# Patient Record
Sex: MALE | Race: WHITE | NOT HISPANIC OR LATINO | Employment: OTHER | URBAN - METROPOLITAN AREA
[De-identification: names, ages, dates, MRNs, and addresses within clinical notes are randomized per-mention and may not be internally consistent; named-entity substitution may affect disease eponyms.]

---

## 2017-04-25 ENCOUNTER — RECORDS - HEALTHEAST (OUTPATIENT)
Dept: LAB | Facility: CLINIC | Age: 67
End: 2017-04-25

## 2017-04-25 LAB
CHOLEST SERPL-MCNC: 172 MG/DL
FASTING STATUS PATIENT QL REPORTED: YES
HDLC SERPL-MCNC: 35 MG/DL
LDLC SERPL CALC-MCNC: 102 MG/DL
PSA SERPL-MCNC: 1.4 NG/ML (ref 0–4.5)
TRIGL SERPL-MCNC: 176 MG/DL

## 2017-10-31 ENCOUNTER — RECORDS - HEALTHEAST (OUTPATIENT)
Dept: LAB | Facility: CLINIC | Age: 67
End: 2017-10-31

## 2017-11-01 LAB
CHOLEST SERPL-MCNC: 186 MG/DL
FASTING STATUS PATIENT QL REPORTED: NO
HDLC SERPL-MCNC: 37 MG/DL
LDLC SERPL CALC-MCNC: 95 MG/DL
TRIGL SERPL-MCNC: 270 MG/DL

## 2018-05-11 ENCOUNTER — RECORDS - HEALTHEAST (OUTPATIENT)
Dept: LAB | Facility: CLINIC | Age: 68
End: 2018-05-11

## 2018-05-11 LAB
ANION GAP SERPL CALCULATED.3IONS-SCNC: 10 MMOL/L (ref 5–18)
BUN SERPL-MCNC: 27 MG/DL (ref 8–22)
CALCIUM SERPL-MCNC: 9.7 MG/DL (ref 8.5–10.5)
CHLORIDE BLD-SCNC: 104 MMOL/L (ref 98–107)
CHOLEST SERPL-MCNC: 183 MG/DL
CO2 SERPL-SCNC: 26 MMOL/L (ref 22–31)
CREAT SERPL-MCNC: 1.2 MG/DL (ref 0.7–1.3)
FASTING STATUS PATIENT QL REPORTED: YES
GFR SERPL CREATININE-BSD FRML MDRD: >60 ML/MIN/1.73M2
GLUCOSE BLD-MCNC: 141 MG/DL (ref 70–125)
HDLC SERPL-MCNC: 34 MG/DL
LDLC SERPL CALC-MCNC: 117 MG/DL
POTASSIUM BLD-SCNC: 3.9 MMOL/L (ref 3.5–5)
SODIUM SERPL-SCNC: 140 MMOL/L (ref 136–145)
TRIGL SERPL-MCNC: 159 MG/DL

## 2018-12-19 ENCOUNTER — RECORDS - HEALTHEAST (OUTPATIENT)
Dept: LAB | Facility: CLINIC | Age: 68
End: 2018-12-19

## 2018-12-19 LAB
ANION GAP SERPL CALCULATED.3IONS-SCNC: 10 MMOL/L (ref 5–18)
BUN SERPL-MCNC: 23 MG/DL (ref 8–22)
CALCIUM SERPL-MCNC: 9.9 MG/DL (ref 8.5–10.5)
CHLORIDE BLD-SCNC: 103 MMOL/L (ref 98–107)
CHOLEST SERPL-MCNC: 180 MG/DL
CO2 SERPL-SCNC: 27 MMOL/L (ref 22–31)
CREAT SERPL-MCNC: 1.05 MG/DL (ref 0.7–1.3)
FASTING STATUS PATIENT QL REPORTED: ABNORMAL
GFR SERPL CREATININE-BSD FRML MDRD: >60 ML/MIN/1.73M2
GLUCOSE BLD-MCNC: 118 MG/DL (ref 70–125)
HDLC SERPL-MCNC: 36 MG/DL
LDLC SERPL CALC-MCNC: 81 MG/DL
POTASSIUM BLD-SCNC: 4.2 MMOL/L (ref 3.5–5)
SODIUM SERPL-SCNC: 140 MMOL/L (ref 136–145)
TRIGL SERPL-MCNC: 314 MG/DL

## 2019-07-16 ENCOUNTER — RECORDS - HEALTHEAST (OUTPATIENT)
Dept: LAB | Facility: CLINIC | Age: 69
End: 2019-07-16

## 2019-07-16 LAB
ANION GAP SERPL CALCULATED.3IONS-SCNC: 11 MMOL/L (ref 5–18)
BUN SERPL-MCNC: 21 MG/DL (ref 8–22)
CALCIUM SERPL-MCNC: 10 MG/DL (ref 8.5–10.5)
CHLORIDE BLD-SCNC: 101 MMOL/L (ref 98–107)
CHOLEST SERPL-MCNC: 178 MG/DL
CO2 SERPL-SCNC: 28 MMOL/L (ref 22–31)
CREAT SERPL-MCNC: 1.04 MG/DL (ref 0.7–1.3)
FASTING STATUS PATIENT QL REPORTED: ABNORMAL
GFR SERPL CREATININE-BSD FRML MDRD: >60 ML/MIN/1.73M2
GLUCOSE BLD-MCNC: 115 MG/DL (ref 70–125)
HDLC SERPL-MCNC: 43 MG/DL
LDLC SERPL CALC-MCNC: 105 MG/DL
POTASSIUM BLD-SCNC: 4.7 MMOL/L (ref 3.5–5)
PSA SERPL-MCNC: 1.3 NG/ML (ref 0–4.5)
SODIUM SERPL-SCNC: 140 MMOL/L (ref 136–145)
TRIGL SERPL-MCNC: 150 MG/DL

## 2019-12-09 ENCOUNTER — RECORDS - HEALTHEAST (OUTPATIENT)
Dept: LAB | Facility: CLINIC | Age: 69
End: 2019-12-09

## 2019-12-09 LAB
ANION GAP SERPL CALCULATED.3IONS-SCNC: 10 MMOL/L (ref 5–18)
BUN SERPL-MCNC: 18 MG/DL (ref 8–22)
CALCIUM SERPL-MCNC: 9.7 MG/DL (ref 8.5–10.5)
CHLORIDE BLD-SCNC: 103 MMOL/L (ref 98–107)
CO2 SERPL-SCNC: 28 MMOL/L (ref 22–31)
CREAT SERPL-MCNC: 1.3 MG/DL (ref 0.7–1.3)
GFR SERPL CREATININE-BSD FRML MDRD: 55 ML/MIN/1.73M2
GLUCOSE BLD-MCNC: 150 MG/DL (ref 70–125)
POTASSIUM BLD-SCNC: 4.3 MMOL/L (ref 3.5–5)
SODIUM SERPL-SCNC: 141 MMOL/L (ref 136–145)

## 2019-12-10 LAB — HBA1C MFR BLD: 6 % (ref 4.2–6.1)

## 2020-07-06 ENCOUNTER — RECORDS - HEALTHEAST (OUTPATIENT)
Dept: LAB | Facility: CLINIC | Age: 70
End: 2020-07-06

## 2020-07-06 LAB
ANION GAP SERPL CALCULATED.3IONS-SCNC: 16 MMOL/L (ref 5–18)
BUN SERPL-MCNC: 19 MG/DL (ref 8–22)
CALCIUM SERPL-MCNC: 9.9 MG/DL (ref 8.5–10.5)
CHLORIDE BLD-SCNC: 102 MMOL/L (ref 98–107)
CHOLEST SERPL-MCNC: 171 MG/DL
CO2 SERPL-SCNC: 22 MMOL/L (ref 22–31)
CREAT SERPL-MCNC: 1.15 MG/DL (ref 0.7–1.3)
FASTING STATUS PATIENT QL REPORTED: YES
GFR SERPL CREATININE-BSD FRML MDRD: >60 ML/MIN/1.73M2
GLUCOSE BLD-MCNC: 132 MG/DL (ref 70–125)
HDLC SERPL-MCNC: 35 MG/DL
LDLC SERPL CALC-MCNC: 85 MG/DL
POTASSIUM BLD-SCNC: 4 MMOL/L (ref 3.5–5)
PSA SERPL-MCNC: 1.4 NG/ML (ref 0–4.5)
SODIUM SERPL-SCNC: 140 MMOL/L (ref 136–145)
TRIGL SERPL-MCNC: 255 MG/DL

## 2020-12-08 ENCOUNTER — RECORDS - HEALTHEAST (OUTPATIENT)
Dept: LAB | Facility: CLINIC | Age: 70
End: 2020-12-08

## 2020-12-08 LAB
ANION GAP SERPL CALCULATED.3IONS-SCNC: 14 MMOL/L (ref 5–18)
BUN SERPL-MCNC: 24 MG/DL (ref 8–22)
CALCIUM SERPL-MCNC: 9.5 MG/DL (ref 8.5–10.5)
CHLORIDE BLD-SCNC: 103 MMOL/L (ref 98–107)
CHOLEST SERPL-MCNC: 160 MG/DL
CO2 SERPL-SCNC: 24 MMOL/L (ref 22–31)
CREAT SERPL-MCNC: 1.03 MG/DL (ref 0.7–1.3)
FASTING STATUS PATIENT QL REPORTED: NORMAL
GFR SERPL CREATININE-BSD FRML MDRD: >60 ML/MIN/1.73M2
GLUCOSE BLD-MCNC: 113 MG/DL (ref 70–125)
HDLC SERPL-MCNC: 41 MG/DL
LDLC SERPL CALC-MCNC: 101 MG/DL
POTASSIUM BLD-SCNC: 4.2 MMOL/L (ref 3.5–5)
SODIUM SERPL-SCNC: 141 MMOL/L (ref 136–145)
TRIGL SERPL-MCNC: 88 MG/DL

## 2021-07-28 ENCOUNTER — LAB REQUISITION (OUTPATIENT)
Dept: LAB | Facility: CLINIC | Age: 71
End: 2021-07-28
Payer: MEDICARE

## 2021-07-28 DIAGNOSIS — E55.9 VITAMIN D DEFICIENCY, UNSPECIFIED: ICD-10-CM

## 2021-07-28 DIAGNOSIS — E78.5 HYPERLIPIDEMIA, UNSPECIFIED: ICD-10-CM

## 2021-07-28 DIAGNOSIS — I10 ESSENTIAL (PRIMARY) HYPERTENSION: ICD-10-CM

## 2021-07-28 DIAGNOSIS — Z12.5 ENCOUNTER FOR SCREENING FOR MALIGNANT NEOPLASM OF PROSTATE: ICD-10-CM

## 2021-07-28 DIAGNOSIS — E04.9 NONTOXIC GOITER, UNSPECIFIED: ICD-10-CM

## 2021-07-28 LAB
ALBUMIN SERPL-MCNC: 4.3 G/DL (ref 3.5–5)
ALP SERPL-CCNC: 20 U/L (ref 45–120)
ALT SERPL W P-5'-P-CCNC: 14 U/L (ref 0–45)
ANION GAP SERPL CALCULATED.3IONS-SCNC: 13 MMOL/L (ref 5–18)
AST SERPL W P-5'-P-CCNC: 22 U/L (ref 0–40)
BILIRUB SERPL-MCNC: 0.8 MG/DL (ref 0–1)
BUN SERPL-MCNC: 22 MG/DL (ref 8–22)
CALCIUM SERPL-MCNC: 9.5 MG/DL (ref 8.5–10.5)
CHLORIDE BLD-SCNC: 103 MMOL/L (ref 98–107)
CHOLEST SERPL-MCNC: 169 MG/DL
CO2 SERPL-SCNC: 27 MMOL/L (ref 22–31)
CREAT SERPL-MCNC: 1.18 MG/DL (ref 0.7–1.3)
GFR SERPL CREATININE-BSD FRML MDRD: 63 ML/MIN/1.73M2
GLUCOSE BLD-MCNC: 124 MG/DL (ref 70–125)
HDLC SERPL-MCNC: 39 MG/DL
LDLC SERPL CALC-MCNC: 109 MG/DL
MAGNESIUM SERPL-MCNC: 2 MG/DL (ref 1.8–2.6)
POTASSIUM BLD-SCNC: 4 MMOL/L (ref 3.5–5)
PROT SERPL-MCNC: 7.4 G/DL (ref 6–8)
PSA SERPL-MCNC: 1.52 UG/L (ref 0–4.5)
SODIUM SERPL-SCNC: 143 MMOL/L (ref 136–145)
T3 SERPL-MCNC: 90 NG/DL (ref 60–181)
T4 SERPL-MCNC: 8.1 UG/DL (ref 4.5–13)
TRIGL SERPL-MCNC: 103 MG/DL
VIT B12 SERPL-MCNC: 789 PG/ML (ref 213–816)

## 2021-07-28 PROCEDURE — 82306 VITAMIN D 25 HYDROXY: CPT | Mod: ORL | Performed by: NURSE PRACTITIONER

## 2021-07-28 PROCEDURE — 84480 ASSAY TRIIODOTHYRONINE (T3): CPT | Mod: ORL | Performed by: NURSE PRACTITIONER

## 2021-07-28 PROCEDURE — 82607 VITAMIN B-12: CPT | Performed by: NURSE PRACTITIONER

## 2021-07-28 PROCEDURE — 36415 COLL VENOUS BLD VENIPUNCTURE: CPT | Performed by: NURSE PRACTITIONER

## 2021-07-28 PROCEDURE — 80061 LIPID PANEL: CPT | Mod: ORL | Performed by: NURSE PRACTITIONER

## 2021-07-28 PROCEDURE — 84436 ASSAY OF TOTAL THYROXINE: CPT | Mod: ORL | Performed by: NURSE PRACTITIONER

## 2021-07-28 PROCEDURE — 86376 MICROSOMAL ANTIBODY EACH: CPT | Mod: ORL | Performed by: NURSE PRACTITIONER

## 2021-07-28 PROCEDURE — G0103 PSA SCREENING: HCPCS | Mod: ORL | Performed by: NURSE PRACTITIONER

## 2021-07-28 PROCEDURE — 83735 ASSAY OF MAGNESIUM: CPT | Performed by: NURSE PRACTITIONER

## 2021-07-28 PROCEDURE — 82040 ASSAY OF SERUM ALBUMIN: CPT | Performed by: NURSE PRACTITIONER

## 2021-07-29 LAB
DEPRECATED CALCIDIOL+CALCIFEROL SERPL-MC: 29 UG/L (ref 30–80)
THYROPEROXIDASE AB SERPL-ACNC: <3 IU/ML (ref 0–6)

## 2021-09-08 ENCOUNTER — TELEPHONE (OUTPATIENT)
Dept: OPHTHALMOLOGY | Facility: CLINIC | Age: 71
End: 2021-09-08

## 2021-09-08 NOTE — TELEPHONE ENCOUNTER
Spoke with patient s spouse regarding rescheduling appointment today 9/8/2021 as Provider had to cancel Clinic. Rescheduled patient accordingly and sent appointment letter to confirmed address.-Per Patent s spouse

## 2021-11-10 ENCOUNTER — OFFICE VISIT (OUTPATIENT)
Dept: OPHTHALMOLOGY | Facility: CLINIC | Age: 71
End: 2021-11-10
Payer: MEDICARE

## 2021-11-10 ENCOUNTER — TELEPHONE (OUTPATIENT)
Dept: OPHTHALMOLOGY | Facility: CLINIC | Age: 71
End: 2021-11-10

## 2021-11-10 DIAGNOSIS — H52.03 HYPERMETROPIA OF BOTH EYES: Primary | ICD-10-CM

## 2021-11-10 DIAGNOSIS — H02.88A MEIBOMIAN GLAND DYSFUNCTION (MGD) OF UPPER AND LOWER LIDS OF BOTH EYES: ICD-10-CM

## 2021-11-10 DIAGNOSIS — H25.093 AGE-RELATED INCIPIENT CATARACT OF BOTH EYES: ICD-10-CM

## 2021-11-10 DIAGNOSIS — H40.003 GLAUCOMA SUSPECT OF BOTH EYES: ICD-10-CM

## 2021-11-10 DIAGNOSIS — H02.88B MEIBOMIAN GLAND DYSFUNCTION (MGD) OF UPPER AND LOWER LIDS OF BOTH EYES: ICD-10-CM

## 2021-11-10 PROCEDURE — 92004 COMPRE OPH EXAM NEW PT 1/>: CPT | Performed by: OPTOMETRIST

## 2021-11-10 PROCEDURE — 92015 DETERMINE REFRACTIVE STATE: CPT | Mod: GY | Performed by: OPTOMETRIST

## 2021-11-10 RX ORDER — HYDROCHLOROTHIAZIDE 25 MG/1
TABLET ORAL
COMMUNITY

## 2021-11-10 RX ORDER — METOPROLOL SUCCINATE 100 MG/1
1 TABLET, EXTENDED RELEASE ORAL
COMMUNITY

## 2021-11-10 RX ORDER — TAMSULOSIN HYDROCHLORIDE 0.4 MG/1
CAPSULE ORAL
COMMUNITY

## 2021-11-10 RX ORDER — BLOOD SUGAR DIAGNOSTIC
STRIP MISCELLANEOUS DAILY
COMMUNITY

## 2021-11-10 ASSESSMENT — REFRACTION_MANIFEST
OD_AXIS: 030
OD_ADD: +2.50
OD_SPHERE: +1.50
OS_CYLINDER: +0.25
OS_SPHERE: +1.50
OD_CYLINDER: +1.00
OS_AXIS: 155
OS_ADD: +2.50

## 2021-11-10 ASSESSMENT — SLIT LAMP EXAM - LIDS
COMMENTS: 1+ MGD
COMMENTS: 1+ MGD

## 2021-11-10 ASSESSMENT — TONOMETRY
OS_IOP_MMHG: 7
IOP_METHOD: ICARE
OD_IOP_MMHG: 11

## 2021-11-10 ASSESSMENT — VISUAL ACUITY
OS_CC: 20/20
METHOD: SNELLEN - LINEAR
CORRECTION_TYPE: GLASSES
OD_CC: 20/20

## 2021-11-10 ASSESSMENT — CONF VISUAL FIELD
OS_NORMAL: 1
METHOD: COUNTING FINGERS
OD_NORMAL: 1

## 2021-11-10 ASSESSMENT — REFRACTION_WEARINGRX
SPECS_TYPE: PAL
OD_ADD: +2.50
OS_ADD: +2.50
OD_AXIS: 030
OS_AXIS: 147
OS_SPHERE: +1.50
OS_CYLINDER: +0.25
OD_CYLINDER: +0.50
OD_SPHERE: +1.50

## 2021-11-10 ASSESSMENT — EXTERNAL EXAM - RIGHT EYE: OD_EXAM: NORMAL

## 2021-11-10 ASSESSMENT — CUP TO DISC RATIO
OS_RATIO: 0.6
OD_RATIO: 0.7

## 2021-11-10 ASSESSMENT — EXTERNAL EXAM - LEFT EYE: OS_EXAM: NORMAL

## 2021-11-10 NOTE — NURSING NOTE
Chief Complaints and History of Present Illnesses   Patient presents with     COMPREHENSIVE EYE EXAM     Chief Complaint(s) and History of Present Illness(es)     COMPREHENSIVE EYE EXAM     Laterality: both eyes    Onset: years ago    Frequency: constantly    Course: stable    Treatments tried: no treatments    Pain scale: 0/10              Comments     Annual eye exam, last exam 2 years ago. Pt feels vision is stable. No pain. No drops.    NIKKI Sharif COT 1:00 PM November 10, 2021

## 2021-11-10 NOTE — PROGRESS NOTES
History  HPI     COMPREHENSIVE EYE EXAM     In both eyes.  This started years ago.  Occurring constantly.  Since onset it is stable.  Treatments tried include no treatments.  Pain was noted as 0/10.              Comments     Annual eye exam, last exam 2 years ago. Pt feels vision is stable. No pain. No drops.    NIKKI Sharif COT 1:00 PM November 10, 2021             Last edited by Elida Hartmann COT on 11/10/2021  1:00 PM. (History)          Assessment/Plan  (H52.03) Hypermetropia of both eyes  (primary encounter diagnosis)  Comment: Hyperopia with presbyopia both eyes   Plan: REFRACTION         Educated patient on condition and clinical findings. Dispensed spectacle prescription for full time wear. Monitor annually.    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 Based on results, determine if further testing is warranted.    (H25.093) Age-related incipient cataract of both eyes  Comment: Not visually significant  Plan:  No treatment indicated at this time. Monitor annually.    (H02.88A,  H02.88B) Meibomian gland dysfunction (MGD) of upper and lower lids of both eyes  Comment: Asymptomatic  Plan:  Recommended warm compresses and artificial tears as needed for comfort.    Complete documentation of historical and exam elements from today's encounter can  be found in the full encounter summary report (not reduplicated in this progress  note). I personally obtained the chief complaint(s) and history of present illness. I  confirmed and edited as necessary the review of systems, past medical/surgical  history, family history, social history, and examination findings as documented by  others; and I examined the patient myself. I personally reviewed the relevant tests,  images, and reports as documented above. I formulated and edited as necessary the  assessment  and plan and discussed the findings and management plan with the  patient and family.    Jose Raul Bean OD, FAAO

## 2021-11-11 ENCOUNTER — TELEPHONE (OUTPATIENT)
Dept: OPHTHALMOLOGY | Facility: CLINIC | Age: 71
End: 2021-11-11
Payer: MEDICARE

## 2021-11-11 NOTE — TELEPHONE ENCOUNTER
Patient returned VM  regarding scheduling for RNFL OCT only (technician only visit at [facility]) -Per . Scheduled patient accordingly and sent appointment letter and map to confirmed email. -Per Patient

## 2021-11-15 ENCOUNTER — ALLIED HEALTH/NURSE VISIT (OUTPATIENT)
Dept: OPHTHALMOLOGY | Facility: CLINIC | Age: 71
End: 2021-11-15
Attending: OPTOMETRIST
Payer: MEDICARE

## 2021-11-15 DIAGNOSIS — H40.003 GLAUCOMA SUSPECT OF BOTH EYES: ICD-10-CM

## 2021-11-15 PROCEDURE — 99207 PR NO CHARGE COORDINATED CARE PS: CPT

## 2022-09-26 ENCOUNTER — LAB REQUISITION (OUTPATIENT)
Dept: LAB | Facility: CLINIC | Age: 72
End: 2022-09-26
Payer: MEDICARE

## 2022-09-26 DIAGNOSIS — E78.5 HYPERLIPIDEMIA, UNSPECIFIED: ICD-10-CM

## 2022-09-26 DIAGNOSIS — E55.9 VITAMIN D DEFICIENCY, UNSPECIFIED: ICD-10-CM

## 2022-09-26 DIAGNOSIS — Z00.00 ENCOUNTER FOR GENERAL ADULT MEDICAL EXAMINATION WITHOUT ABNORMAL FINDINGS: ICD-10-CM

## 2022-09-26 DIAGNOSIS — I10 ESSENTIAL (PRIMARY) HYPERTENSION: ICD-10-CM

## 2022-09-26 LAB
ANION GAP SERPL CALCULATED.3IONS-SCNC: 9 MMOL/L (ref 7–15)
BUN SERPL-MCNC: 18.1 MG/DL (ref 8–23)
CALCIUM SERPL-MCNC: 9.4 MG/DL (ref 8.8–10.2)
CHLORIDE SERPL-SCNC: 98 MMOL/L (ref 98–107)
CHOLEST SERPL-MCNC: 181 MG/DL
CREAT SERPL-MCNC: 1.18 MG/DL (ref 0.67–1.17)
DEPRECATED HCO3 PLAS-SCNC: 30 MMOL/L (ref 22–29)
GFR SERPL CREATININE-BSD FRML MDRD: 67 ML/MIN/1.73M2
GLUCOSE SERPL-MCNC: 133 MG/DL (ref 70–99)
HDLC SERPL-MCNC: 39 MG/DL
LDLC SERPL CALC-MCNC: 110 MG/DL
MAGNESIUM SERPL-MCNC: 1.9 MG/DL (ref 1.7–2.3)
NONHDLC SERPL-MCNC: 142 MG/DL
POTASSIUM SERPL-SCNC: 4.1 MMOL/L (ref 3.4–5.3)
PSA SERPL-MCNC: 1.5 NG/ML (ref 0–4.5)
SODIUM SERPL-SCNC: 137 MMOL/L (ref 136–145)
TRIGL SERPL-MCNC: 158 MG/DL

## 2022-09-26 PROCEDURE — G0103 PSA SCREENING: HCPCS | Mod: ORL | Performed by: NURSE PRACTITIONER

## 2022-09-26 PROCEDURE — 80061 LIPID PANEL: CPT | Mod: ORL | Performed by: NURSE PRACTITIONER

## 2022-09-26 PROCEDURE — 80048 BASIC METABOLIC PNL TOTAL CA: CPT | Mod: ORL | Performed by: NURSE PRACTITIONER

## 2022-09-26 PROCEDURE — 83735 ASSAY OF MAGNESIUM: CPT | Mod: ORL | Performed by: NURSE PRACTITIONER

## 2022-09-26 PROCEDURE — 82306 VITAMIN D 25 HYDROXY: CPT | Mod: ORL | Performed by: NURSE PRACTITIONER

## 2022-09-27 LAB — DEPRECATED CALCIDIOL+CALCIFEROL SERPL-MC: 32 UG/L (ref 20–75)

## 2023-09-12 ENCOUNTER — LAB REQUISITION (OUTPATIENT)
Dept: LAB | Facility: CLINIC | Age: 73
End: 2023-09-12
Payer: MEDICARE

## 2023-09-12 DIAGNOSIS — I10 ESSENTIAL (PRIMARY) HYPERTENSION: ICD-10-CM

## 2023-09-12 DIAGNOSIS — E78.5 HYPERLIPIDEMIA, UNSPECIFIED: ICD-10-CM

## 2023-09-12 DIAGNOSIS — Z12.5 ENCOUNTER FOR SCREENING FOR MALIGNANT NEOPLASM OF PROSTATE: ICD-10-CM

## 2023-09-12 PROCEDURE — 80061 LIPID PANEL: CPT | Mod: ORL | Performed by: NURSE PRACTITIONER

## 2023-09-12 PROCEDURE — 80053 COMPREHEN METABOLIC PANEL: CPT | Mod: ORL | Performed by: NURSE PRACTITIONER

## 2023-09-12 PROCEDURE — G0103 PSA SCREENING: HCPCS | Mod: ORL | Performed by: NURSE PRACTITIONER

## 2023-09-12 PROCEDURE — 83735 ASSAY OF MAGNESIUM: CPT | Mod: ORL | Performed by: NURSE PRACTITIONER

## 2023-09-13 LAB
ALBUMIN SERPL BCG-MCNC: 4.5 G/DL (ref 3.5–5.2)
ALP SERPL-CCNC: 17 U/L (ref 40–129)
ALT SERPL W P-5'-P-CCNC: 18 U/L (ref 0–70)
ANION GAP SERPL CALCULATED.3IONS-SCNC: 17 MMOL/L (ref 7–15)
AST SERPL W P-5'-P-CCNC: 23 U/L (ref 0–45)
BILIRUB SERPL-MCNC: 0.6 MG/DL
BUN SERPL-MCNC: 20.3 MG/DL (ref 8–23)
CALCIUM SERPL-MCNC: 9.9 MG/DL (ref 8.8–10.2)
CHLORIDE SERPL-SCNC: 100 MMOL/L (ref 98–107)
CHOLEST SERPL-MCNC: 181 MG/DL
CREAT SERPL-MCNC: 1.22 MG/DL (ref 0.67–1.17)
DEPRECATED HCO3 PLAS-SCNC: 23 MMOL/L (ref 22–29)
EGFRCR SERPLBLD CKD-EPI 2021: 64 ML/MIN/1.73M2
GLUCOSE SERPL-MCNC: 135 MG/DL (ref 70–99)
HDLC SERPL-MCNC: 40 MG/DL
LDLC SERPL CALC-MCNC: 116 MG/DL
MAGNESIUM SERPL-MCNC: 2 MG/DL (ref 1.7–2.3)
NONHDLC SERPL-MCNC: 141 MG/DL
POTASSIUM SERPL-SCNC: 4.2 MMOL/L (ref 3.4–5.3)
PROT SERPL-MCNC: 7.8 G/DL (ref 6.4–8.3)
PSA SERPL DL<=0.01 NG/ML-MCNC: 1.24 NG/ML (ref 0–6.5)
SODIUM SERPL-SCNC: 140 MMOL/L (ref 136–145)
TRIGL SERPL-MCNC: 127 MG/DL

## 2024-04-18 ENCOUNTER — LAB REQUISITION (OUTPATIENT)
Dept: LAB | Facility: CLINIC | Age: 74
End: 2024-04-18
Payer: MEDICARE

## 2024-04-18 DIAGNOSIS — E78.5 HYPERLIPIDEMIA, UNSPECIFIED: ICD-10-CM

## 2024-04-18 DIAGNOSIS — I10 ESSENTIAL (PRIMARY) HYPERTENSION: ICD-10-CM

## 2024-04-18 PROCEDURE — 80053 COMPREHEN METABOLIC PANEL: CPT | Mod: ORL | Performed by: NURSE PRACTITIONER

## 2024-04-18 PROCEDURE — 80061 LIPID PANEL: CPT | Mod: ORL | Performed by: NURSE PRACTITIONER

## 2024-04-19 LAB
ALBUMIN SERPL BCG-MCNC: 4.4 G/DL (ref 3.5–5.2)
ALP SERPL-CCNC: 16 U/L (ref 40–150)
ALT SERPL W P-5'-P-CCNC: 14 U/L (ref 0–70)
ANION GAP SERPL CALCULATED.3IONS-SCNC: 11 MMOL/L (ref 7–15)
AST SERPL W P-5'-P-CCNC: 21 U/L (ref 0–45)
BILIRUB SERPL-MCNC: 0.6 MG/DL
BUN SERPL-MCNC: 21.4 MG/DL (ref 8–23)
CALCIUM SERPL-MCNC: 9.7 MG/DL (ref 8.8–10.2)
CHLORIDE SERPL-SCNC: 102 MMOL/L (ref 98–107)
CHOLEST SERPL-MCNC: 176 MG/DL
CREAT SERPL-MCNC: 1.17 MG/DL (ref 0.67–1.17)
DEPRECATED HCO3 PLAS-SCNC: 25 MMOL/L (ref 22–29)
EGFRCR SERPLBLD CKD-EPI 2021: 67 ML/MIN/1.73M2
FASTING STATUS PATIENT QL REPORTED: ABNORMAL
GLUCOSE SERPL-MCNC: 137 MG/DL (ref 70–99)
HDLC SERPL-MCNC: 41 MG/DL
LDLC SERPL CALC-MCNC: 110 MG/DL
NONHDLC SERPL-MCNC: 135 MG/DL
POTASSIUM SERPL-SCNC: 4.2 MMOL/L (ref 3.4–5.3)
PROT SERPL-MCNC: 7.4 G/DL (ref 6.4–8.3)
SODIUM SERPL-SCNC: 138 MMOL/L (ref 135–145)
TRIGL SERPL-MCNC: 126 MG/DL

## 2024-05-01 ENCOUNTER — LAB REQUISITION (OUTPATIENT)
Dept: LAB | Facility: CLINIC | Age: 74
End: 2024-05-01
Payer: MEDICARE

## 2024-05-01 DIAGNOSIS — R74.8 ABNORMAL LEVELS OF OTHER SERUM ENZYMES: ICD-10-CM

## 2024-05-01 PROCEDURE — 83735 ASSAY OF MAGNESIUM: CPT | Mod: ORL | Performed by: NURSE PRACTITIONER

## 2024-05-01 PROCEDURE — 84100 ASSAY OF PHOSPHORUS: CPT | Mod: ORL | Performed by: NURSE PRACTITIONER

## 2024-05-01 PROCEDURE — 84443 ASSAY THYROID STIM HORMONE: CPT | Mod: ORL | Performed by: NURSE PRACTITIONER

## 2024-05-02 LAB
MAGNESIUM SERPL-MCNC: 2 MG/DL (ref 1.7–2.3)
PHOSPHATE SERPL-MCNC: 4 MG/DL (ref 2.5–4.5)
TSH SERPL DL<=0.005 MIU/L-ACNC: 2.04 UIU/ML (ref 0.3–4.2)

## 2024-10-29 NOTE — PROGRESS NOTES
Medication Therapy Management (MTM) Encounter    ASSESSMENT:                            Medication Adherence/Access: No issues identified.    Diabetes   Patient is meeting A1c goal of < 7%.  Patient would benefit from starting continuous glucose monitoring.     Hypertension   Stable.    Hyperlipidemia   Stable.    BPH  Stable.     PLAN:                            Today we placed a Freestyle Sunita sensor on your arm to track your blood sugars throughout the day. With your phone as your reader, it will track your blood sugars continuously. At a minimum, try to check your sushma a few times each day to see where you are at with your blood sugar readings.   Continue taking all medications as prescribed at this time.     Follow-up: Return in 1 week (on 11/6/2024) for Follow up with Areli Yung.    SUBJECTIVE/OBJECTIVE:                          Sudhir Gonzales is a 73 year old male seen for an initial visit. He was referred to me from Areli Yung.      Reason for visit: Discuss diabetes management.    Allergies/ADRs: Reviewed in chart  Past Medical History: Reviewed in chart  Tobacco: He reports that he has never smoked. He has never used smokeless tobacco.  Alcohol: 1-3 beverages / week    Medication Adherence/Access: no issues reported.         Diabetes   Patient is in clinic today to discuss diabetes management. He has been diet controlled and has never taken blood sugar lowering medications. Notes his fasting blood sugars have trended up over the last three months. Now his readings are more in the 140s, where previously they were consistently in the 120s. There have not been any changes with his diet or exercise habits. Patient follows a clean diet and exercises frequently. He is in today to discuss a CGM. Would like to monitor blood sugars more closely before adding any medications.     Aspirin 81mg daily  Patient is not experiencing side effects.  Current diabetes symptoms: none  Diet/Exercise: boxing often. Following a keto  diet, chicken, fish, pork, vegetables. Goes out to eat infrequently.      Blood sugar monitoring: CGM sensor applied today.   Eye exam is up to date  Foot exam is up to date    Hypertension   Lisinopril 10 mg tablet by mouth daily   Hydrochlorothiazide 25 mg tablet by mouth daily   Metoprolol Succinate  mg tablet by mouth daily   Patient reports no current medication side effects  Patient does not self-monitor blood pressure.       Hyperlipidemia   Atorvastatin 40 mg daily  Patient reports no significant myalgias or other side effects.       BPH:   Tamsulosin 0.4 mg capsule by mouth daily   Denies issues or concerns.       Today's Vitals: /72   Wt 164 lb (74.4 kg)   ----------------      I spent 30 minutes with this patient today. All changes were made via collaborative practice agreement with PADMINI Landon CNP. A copy of the visit note was provided to the patient's provider(s).    A summary of these recommendations was sent via clinic portal.    Yanira Min, PharmD  Medication Therapy Management Pharmacist       Medication Therapy Recommendations  Type 2 diabetes mellitus without complication, without long-term current use of insulin (H)   1 Rationale: Nonmedication therapy more appropriate - Unnecessary medication therapy - Indication   Recommendation: Self-Monitoring   Status: Accepted per CPA   Identified Date: 10/30/2024 Completed Date: 10/30/2024   Note: fritz sensor

## 2024-10-30 ENCOUNTER — OFFICE VISIT (OUTPATIENT)
Dept: PHARMACY | Facility: PHYSICIAN GROUP | Age: 74
End: 2024-10-30

## 2024-10-30 VITALS — WEIGHT: 164 LBS | DIASTOLIC BLOOD PRESSURE: 72 MMHG | SYSTOLIC BLOOD PRESSURE: 128 MMHG

## 2024-10-30 DIAGNOSIS — I10 BENIGN ESSENTIAL HYPERTENSION: ICD-10-CM

## 2024-10-30 DIAGNOSIS — E78.2 MIXED HYPERLIPIDEMIA: ICD-10-CM

## 2024-10-30 DIAGNOSIS — N40.0 BENIGN PROSTATIC HYPERPLASIA WITHOUT LOWER URINARY TRACT SYMPTOMS: ICD-10-CM

## 2024-10-30 DIAGNOSIS — E11.9 TYPE 2 DIABETES MELLITUS WITHOUT COMPLICATION, WITHOUT LONG-TERM CURRENT USE OF INSULIN (H): Primary | ICD-10-CM

## 2024-10-30 PROCEDURE — 99207 PR NO CHARGE LOS: CPT | Performed by: PHARMACIST

## 2024-10-30 RX ORDER — LISINOPRIL 10 MG/1
10 TABLET ORAL DAILY
COMMUNITY

## 2024-10-30 RX ORDER — ATORVASTATIN CALCIUM 40 MG/1
40 TABLET, FILM COATED ORAL DAILY
COMMUNITY
Start: 2024-07-16

## 2024-10-30 NOTE — PATIENT INSTRUCTIONS
"Recommendations from today's MTM visit:                                                    MTM (medication therapy management) is a service provided by a clinical pharmacist designed to help you get the most of out of your medicines.   Today we reviewed what your medicines are for, how to know if they are working, that your medicines are safe and how to make your medicine regimen as easy as possible.      Today we placed a Freestyle Sunita sensor on your arm to track your blood sugars throughout the day. With your phone as your reader, it will track your blood sugars continuously. At a minimum, try to check your sushma a few times each day to see where you are at with your blood sugar readings.   Continue taking all medications as prescribed at this time.     Follow-up: Return in 1 week (on 11/6/2024) for Follow up with Areli Yung.    It was great speaking with you today.  I value your experience and would be very thankful for your time in providing feedback in our clinic survey. In the next few days, you may receive an email or text message from [employer] with a link to a survey related to your  clinical pharmacist.\"     To schedule another MTM appointment, please call the clinic directly or you may call the MTM scheduling line at [phone].    My Clinical Pharmacist's contact information:                                                      Please feel free to contact me with any questions or concerns you have.      Yanira Min, PharmD  Medication Therapy Management Pharmacist    "

## 2024-11-06 ENCOUNTER — LAB REQUISITION (OUTPATIENT)
Dept: LAB | Facility: CLINIC | Age: 74
End: 2024-11-06
Payer: MEDICARE

## 2024-11-06 DIAGNOSIS — E78.5 HYPERLIPIDEMIA, UNSPECIFIED: ICD-10-CM

## 2024-11-06 DIAGNOSIS — Z12.5 ENCOUNTER FOR SCREENING FOR MALIGNANT NEOPLASM OF PROSTATE: ICD-10-CM

## 2024-11-06 DIAGNOSIS — E11.9 TYPE 2 DIABETES MELLITUS WITHOUT COMPLICATIONS (H): ICD-10-CM

## 2024-11-06 LAB
ALBUMIN SERPL BCG-MCNC: 4.3 G/DL (ref 3.5–5.2)
ALP SERPL-CCNC: 16 U/L (ref 40–150)
ALT SERPL W P-5'-P-CCNC: 20 U/L (ref 0–70)
ANION GAP SERPL CALCULATED.3IONS-SCNC: 14 MMOL/L (ref 7–15)
AST SERPL W P-5'-P-CCNC: 21 U/L (ref 0–45)
BILIRUB SERPL-MCNC: 0.4 MG/DL
BUN SERPL-MCNC: 29.6 MG/DL (ref 8–23)
CALCIUM SERPL-MCNC: 9.5 MG/DL (ref 8.8–10.4)
CHLORIDE SERPL-SCNC: 101 MMOL/L (ref 98–107)
CHOLEST SERPL-MCNC: 111 MG/DL
CREAT SERPL-MCNC: 1.28 MG/DL (ref 0.67–1.17)
EGFRCR SERPLBLD CKD-EPI 2021: 59 ML/MIN/1.73M2
FASTING STATUS PATIENT QL REPORTED: ABNORMAL
FASTING STATUS PATIENT QL REPORTED: NORMAL
GLUCOSE SERPL-MCNC: 154 MG/DL (ref 70–99)
HCO3 SERPL-SCNC: 24 MMOL/L (ref 22–29)
HDLC SERPL-MCNC: 40 MG/DL
LDLC SERPL CALC-MCNC: 56 MG/DL
NONHDLC SERPL-MCNC: 71 MG/DL
POTASSIUM SERPL-SCNC: 3.9 MMOL/L (ref 3.4–5.3)
PROT SERPL-MCNC: 7.4 G/DL (ref 6.4–8.3)
PSA SERPL DL<=0.01 NG/ML-MCNC: 1.56 NG/ML (ref 0–6.5)
SODIUM SERPL-SCNC: 139 MMOL/L (ref 135–145)
TRIGL SERPL-MCNC: 74 MG/DL

## 2024-11-06 PROCEDURE — 80053 COMPREHEN METABOLIC PANEL: CPT | Mod: ORL | Performed by: NURSE PRACTITIONER

## 2024-11-06 PROCEDURE — 82043 UR ALBUMIN QUANTITATIVE: CPT | Mod: ORL | Performed by: NURSE PRACTITIONER

## 2024-11-06 PROCEDURE — 80061 LIPID PANEL: CPT | Mod: ORL | Performed by: NURSE PRACTITIONER

## 2024-11-06 PROCEDURE — G0103 PSA SCREENING: HCPCS | Mod: ORL | Performed by: NURSE PRACTITIONER

## 2024-11-06 PROCEDURE — 82570 ASSAY OF URINE CREATININE: CPT | Mod: ORL | Performed by: NURSE PRACTITIONER

## 2024-11-07 LAB
CREAT UR-MCNC: 62.7 MG/DL
MICROALBUMIN UR-MCNC: <12 MG/L
MICROALBUMIN/CREAT UR: NORMAL MG/G{CREAT}

## 2025-06-16 ENCOUNTER — TRANSFERRED RECORDS (OUTPATIENT)
Dept: HEALTH INFORMATION MANAGEMENT | Facility: CLINIC | Age: 75
End: 2025-06-16

## 2025-06-16 ENCOUNTER — LAB REQUISITION (OUTPATIENT)
Dept: LAB | Facility: CLINIC | Age: 75
End: 2025-06-16
Payer: MEDICARE

## 2025-06-16 DIAGNOSIS — E11.9 TYPE 2 DIABETES MELLITUS WITHOUT COMPLICATIONS (H): ICD-10-CM

## 2025-06-16 LAB
ANION GAP SERPL CALCULATED.3IONS-SCNC: 13 MMOL/L (ref 7–15)
BUN SERPL-MCNC: 24.8 MG/DL (ref 8–23)
CALCIUM SERPL-MCNC: 9.8 MG/DL (ref 8.8–10.4)
CHLORIDE SERPL-SCNC: 102 MMOL/L (ref 98–107)
CREAT SERPL-MCNC: 1.37 MG/DL (ref 0.67–1.17)
CREAT UR-MCNC: 121 MG/DL
EGFRCR SERPLBLD CKD-EPI 2021: 55 ML/MIN/1.73M2
GLUCOSE SERPL-MCNC: 147 MG/DL (ref 70–99)
HBA1C MFR BLD: 6.5 % (ref 4.2–6.1)
HCO3 SERPL-SCNC: 25 MMOL/L (ref 22–29)
MICROALBUMIN UR-MCNC: <12 MG/L
MICROALBUMIN/CREAT UR: NORMAL MG/G{CREAT}
POTASSIUM SERPL-SCNC: 4.2 MMOL/L (ref 3.4–5.3)
SODIUM SERPL-SCNC: 140 MMOL/L (ref 135–145)

## 2025-06-16 PROCEDURE — 82570 ASSAY OF URINE CREATININE: CPT | Mod: ORL

## 2025-06-16 PROCEDURE — 80048 BASIC METABOLIC PNL TOTAL CA: CPT | Mod: ORL
